# Patient Record
Sex: MALE | Race: WHITE | NOT HISPANIC OR LATINO | Employment: FULL TIME | URBAN - METROPOLITAN AREA
[De-identification: names, ages, dates, MRNs, and addresses within clinical notes are randomized per-mention and may not be internally consistent; named-entity substitution may affect disease eponyms.]

---

## 2017-06-16 ENCOUNTER — OFFICE VISIT (OUTPATIENT)
Dept: INTERNAL MEDICINE | Facility: CLINIC | Age: 37
End: 2017-06-16
Payer: COMMERCIAL

## 2017-06-16 VITALS
HEIGHT: 71 IN | HEART RATE: 77 BPM | BODY MASS INDEX: 25.74 KG/M2 | SYSTOLIC BLOOD PRESSURE: 120 MMHG | WEIGHT: 183.88 LBS | OXYGEN SATURATION: 99 % | TEMPERATURE: 97 F | DIASTOLIC BLOOD PRESSURE: 82 MMHG

## 2017-06-16 DIAGNOSIS — Z23 NEED FOR TDAP VACCINATION: ICD-10-CM

## 2017-06-16 DIAGNOSIS — F41.9 ANXIETY: ICD-10-CM

## 2017-06-16 DIAGNOSIS — Z00.00 ROUTINE GENERAL MEDICAL EXAMINATION AT A HEALTH CARE FACILITY: Primary | ICD-10-CM

## 2017-06-16 PROCEDURE — 99385 PREV VISIT NEW AGE 18-39: CPT | Mod: 25,S$GLB,, | Performed by: INTERNAL MEDICINE

## 2017-06-16 PROCEDURE — 90471 IMMUNIZATION ADMIN: CPT | Mod: S$GLB,,, | Performed by: INTERNAL MEDICINE

## 2017-06-16 PROCEDURE — 99999 PR PBB SHADOW E&M-NEW PATIENT-LVL III: CPT | Mod: PBBFAC,,, | Performed by: INTERNAL MEDICINE

## 2017-06-16 PROCEDURE — 90715 TDAP VACCINE 7 YRS/> IM: CPT | Mod: S$GLB,,, | Performed by: INTERNAL MEDICINE

## 2017-06-16 RX ORDER — FLUOXETINE 10 MG/1
10 CAPSULE ORAL DAILY
Qty: 90 CAPSULE | Refills: 3 | Status: SHIPPED | OUTPATIENT
Start: 2017-06-16 | End: 2018-06-24 | Stop reason: SDUPTHER

## 2017-06-16 RX ORDER — FLUOXETINE 10 MG/1
1 CAPSULE ORAL DAILY
Refills: 0 | COMMUNITY
Start: 2017-05-02 | End: 2017-06-16 | Stop reason: SDUPTHER

## 2017-06-16 RX ORDER — ONDANSETRON 4 MG/1
1 TABLET, ORALLY DISINTEGRATING ORAL DAILY
Refills: 0 | COMMUNITY
Start: 2017-04-24 | End: 2017-06-16

## 2017-06-16 NOTE — PROGRESS NOTES
"Subjective:      Patient ID: Sylvain Theodore is a 36 y.o. male.    Chief Complaint: Establish Care    37 yo with History reviewed. No pertinent past medical history.    Here today for annual exam/ prevent exam. Feeling well and in usual state of health.  Non smoker. prozac continues to work well for anxiety.     family history includes No Known Problems in his father and mother.                Review of Systems   Constitutional: Negative for chills and fever.   HENT: Negative for ear pain and sore throat.    Respiratory: Negative for cough.    Cardiovascular: Negative for chest pain.   Gastrointestinal: Negative for abdominal pain and blood in stool.   Genitourinary: Negative for dysuria and hematuria.   Neurological: Negative for seizures and syncope.     Objective:   /82 (BP Location: Right arm, Patient Position: Sitting)   Pulse 77   Temp 96.7 °F (35.9 °C) (Tympanic)   Ht 5' 11" (1.803 m)   Wt 83.4 kg (183 lb 13.8 oz)   SpO2 99%   BMI 25.64 kg/m²     Physical Exam   Constitutional: He is oriented to person, place, and time. He appears well-developed and well-nourished. No distress.   HENT:   Head: Normocephalic and atraumatic.   Mouth/Throat: Oropharynx is clear and moist.   Eyes: EOM are normal. Pupils are equal, round, and reactive to light.   Neck: Neck supple. No thyromegaly present.   Cardiovascular: Normal rate and regular rhythm.    Pulmonary/Chest: Breath sounds normal. He has no wheezes. He has no rales.   Abdominal: Soft. Bowel sounds are normal. There is no tenderness.   Lymphadenopathy:     He has no cervical adenopathy.   Neurological: He is alert and oriented to person, place, and time.   Skin: Skin is warm and dry.   Psychiatric: He has a normal mood and affect. His behavior is normal.         Assessment:     1. Routine general medical examination at a health care facility    2. Anxiety    3. Need for Tdap vaccination      Plan:   Routine general medical examination at a [facility]" facility  -     Lipid panel; Future; Expected date: 06/16/2017  -     TSH; Future; Expected date: 06/16/2017  -     Vitamin D; Future; Expected date: 06/16/2017  -     Comprehensive metabolic panel; Future; Expected date: 06/16/2017  -     CBC auto differential; Future; Expected date: 06/16/2017    Anxiety  -     fluoxetine (PROZAC) 10 MG capsule; Take 1 capsule (10 mg total) by mouth once daily.  Dispense: 90 capsule; Refill: 3    Need for Tdap vaccination  -     (In Office Administered) Tdap Vaccine              Return in about 2 years (around 6/16/2019), or if symptoms worsen or fail to improve.

## 2018-06-24 ENCOUNTER — TELEPHONE (OUTPATIENT)
Dept: INTERNAL MEDICINE | Facility: CLINIC | Age: 38
End: 2018-06-24

## 2018-06-24 DIAGNOSIS — F41.9 ANXIETY: ICD-10-CM

## 2018-06-24 RX ORDER — FLUOXETINE 10 MG/1
10 CAPSULE ORAL DAILY
Qty: 90 CAPSULE | Refills: 3 | Status: SHIPPED | OUTPATIENT
Start: 2018-06-24 | End: 2020-08-26

## 2020-04-13 ENCOUNTER — TELEPHONE (OUTPATIENT)
Dept: INTERNAL MEDICINE | Facility: CLINIC | Age: 40
End: 2020-04-13

## 2020-08-26 ENCOUNTER — OFFICE VISIT (OUTPATIENT)
Dept: PRIMARY CARE CLINIC | Facility: CLINIC | Age: 40
End: 2020-08-26
Payer: COMMERCIAL

## 2020-08-26 VITALS
BODY MASS INDEX: 25.67 KG/M2 | SYSTOLIC BLOOD PRESSURE: 130 MMHG | DIASTOLIC BLOOD PRESSURE: 80 MMHG | OXYGEN SATURATION: 98 % | HEART RATE: 82 BPM | WEIGHT: 184.06 LBS | TEMPERATURE: 99 F

## 2020-08-26 DIAGNOSIS — H69.92 EUSTACHIAN TUBE DISORDER, LEFT: ICD-10-CM

## 2020-08-26 DIAGNOSIS — Z71.89 EDUCATED ABOUT COVID-19 VIRUS INFECTION: Primary | ICD-10-CM

## 2020-08-26 PROCEDURE — 99204 OFFICE O/P NEW MOD 45 MIN: CPT | Mod: S$GLB,,, | Performed by: FAMILY MEDICINE

## 2020-08-26 PROCEDURE — 99999 PR PBB SHADOW E&M-EST. PATIENT-LVL III: ICD-10-PCS | Mod: PBBFAC,,, | Performed by: FAMILY MEDICINE

## 2020-08-26 PROCEDURE — 99204 PR OFFICE/OUTPT VISIT, NEW, LEVL IV, 45-59 MIN: ICD-10-PCS | Mod: S$GLB,,, | Performed by: FAMILY MEDICINE

## 2020-08-26 PROCEDURE — 99999 PR PBB SHADOW E&M-EST. PATIENT-LVL III: CPT | Mod: PBBFAC,,, | Performed by: FAMILY MEDICINE

## 2020-08-26 NOTE — PATIENT INSTRUCTIONS
"Flonase Sensimyst - 1 spray each nostril twice a day for 3 days then decrease to 1 spray each nostril daily for ear fluid/nasal congestion. Can follow it with Afrin/Oxymetazoline nasal spray for 3 days. If not improving, please send me a MyChart [facility] Message for further recommendations and treatment options.     It was a pleasure meeting you and I look forward to caring for you and your family!  Sincerely,   Dayami "Sandi" Love MD Daily:   What is this medicine?  Oxymetazoline (OX ee me CHESTER oh donny) is a nasal decongestant. This medicine is used to treat nasal congestion or a stuffy nose. This medicine will not treat an infection.  How should I use this medicine?  This medicine is for use in the nose. Do not take by mouth. Follow the directions on the package label. Shake well before using. Use your medicine at regular intervals or as directed by your health care provider. Do not use it more often than directed. Do not use for more than 3 days in a row without advice. Make sure that you are using your nasal spray correctly. Ask your doctor or health care provider if you have any questions.  Talk to your pediatrician regarding the use of this medicine in children. While this drug may be prescribed for children for selected conditions, precautions do apply.  What side effects may I notice from receiving this medicine?  Side effects that you should report to your doctor or health care professional as soon as possible:  · allergic reactions like skin rash, itching or hives, swelling of the face, lips, or tongue  Side effects that usually do not require medical attention (Report these to your doctor or health care professional if they continue or are bothersome.):  · burning, stinging, or irritation in the nose right after use  · increased nasal discharge  · sneezing  What may interact with this medicine?  Do not take this medicine with any of the following medications:  · MAOIs like Marplan, Nardil, and " "Parnate  What if I miss a dose?  If you miss a dose, use it as soon as you can. If it is almost time for your next dose, use only that dose. Do not use double or extra doses.  Where should I keep my medicine?  Keep out of the reach of children.  Store at room temperature between 20 and 25 degrees C (68 and 77 degrees F). Throw away any unused medicine after the expiration date.  What should I tell my health care provider before I take this medicine?  They need to know if you have any of these conditions:  · diabetes  · heart disease  · high blood pressure  · thyroid disease  · trouble urinating due to an enlarged prostate gland  · an unusual or allergic reaction to oxymetazoline, other medicines, foods, dyes, or preservatives  · pregnant or trying to get pregnant  · breast-feeding  What should I watch for while using this medicine?  Tell your doctor or healthcare professional if your symptoms do not start to get better or if they get worse.  Do not share this bottle with anyone else as this may spread germs.  NOTE:This sheet is a summary. It may not cover all possible information. If you have questions about this medicine, talk to your doctor, pharmacist, or health care provider. Copyright© 2017 [employer]        Viral Syndrome (Adult)  A viral illness may cause a number of symptoms. The symptoms depend on the part of the body that the virus affects. If it settles in your nose, throat, and lungs, it may cause cough, sore throat, congestion, and sometimes headache. If it settles in your stomach and intestinal tract, it may cause vomiting and diarrhea. Sometimes it causes vague symptoms like "aching all over," feeling tired, loss of appetite, or fever.  A viral illness usually lasts 1 to 2 weeks, but sometimes it lasts longer. In some cases, a more serious infection can look like a viral syndrome in the first few days of the illness. You may need another exam and additional tests to know the difference. Watch for " the warning signs listed below.  Home care  Follow these guidelines for taking care of yourself at home:  · If symptoms are severe, rest at home for the first 2 to 3 days.  · Stay away from cigarette smoke - both your smoke and the smoke from others.  · You may use over-the-counter acetaminophen or ibuprofen for fever, muscle aching, and headache, unless another medicine was prescribed for this. If you have chronic liver or kidney disease or ever had a stomach ulcer or GI bleeding, talk with your doctor before using these medicines. No one who is younger than 18 and ill with a fever should take aspirin. It may cause severe disease or death.  · Your appetite may be poor, so a light diet is fine. Avoid dehydration by drinking 8 to 12 8-ounce glasses of fluids each day. This may include water; orange juice; lemonade; apple, grape, and cranberry juice; clear fruit drinks; electrolyte replacement and sports drinks; and decaffeinated teas and coffee. If you have been diagnosed with a kidney disease, ask your doctor how much and what types of fluids you should drink to prevent dehydration. If you have kidney disease, drinking too much fluid can cause it build up in the your body and be dangerous to your health.  · Over-the-counter remedies won't shorten the length of the illness but may be helpful for cough, sore throat; and nasal and sinus congestion. Don't use decongestants if you have high blood pressure.  Follow-up care  Follow up with your healthcare provider if you do not improve over the next week.  Call 911  Get emergency medical care if any of the following occur:  · Convulsion  · Feeling weak, dizzy, or like you are going to faint  · Chest pain, shortness of breath, wheezing, or difficulty breathing  When to seek medical advice  Call your healthcare provider right away if any of these occur:  · Cough with lots of colored sputum (mucus) or blood in your sputum  · Chest pain, shortness of breath, wheezing, or  "difficulty breathing  · Severe headache; face, neck, or ear pain  · Severe, constant pain in the lower right side of your belly (abdominal)  · Continued vomiting (cant keep liquids down)  · Frequent diarrhea (more than 5 times a day); blood (red or black color) or mucus in diarrhea  · Feeling weak, dizzy, or like you are going to faint  · Extreme thirst  · Fever of 100.4°F (38°C) or higher, or as directed by your healthcare provider  Date Last Reviewed: 9/25/2015 © 2000-2017 [employer]. [address] [place] [place] [address]. All rights reserved. This information is not intended as a substitute for professional medical care. Always follow your healthcare professional's instructions.        Viral Syndrome (Child)  A virus is the most common cause of illness among children. This may cause a number of different symptoms, depending on what part of the body is affected. If the virus settles in the nose, throat, and lungs, it causes cough, congestion, and sometimes headache. If it settles in the stomach and intestinal tract, it causes vomiting and diarrhea. Sometimes it causes vague symptoms of "feeling bad all over," with fussiness, poor appetite, poor sleeping, and lots of crying. A light rash may also appear for the first few days, then fade away.  A viral illness usually lasts 1 to 2 weeks, but sometimes it lasts longer. Home measures are all that are needed to treat a viral illness. Antibiotics don't help. Occasionally, a more serious bacterial infection can look like a viral syndrome in the first few days of the illness.   Home care  Follow these guidelines to care for your child at home:  · Fluids. Fever increases water loss from the body. For infants under 1 year old, continue regular feedings (formula or breast). Between feedings give oral rehydration solution, which is available from groceries and drugstores without a prescription. For children older than 1 year, give plenty of fluids like " water, juice, ginger ale, lemonade, fruit-based drinks, or popsicles.    · Food. If your child doesn't want to eat solid foods, it's OK for a few days, as long as he or she drinks lots of fluid. (If your child has been diagnosed with a kidney disease, ask your childs doctor how much and what types of fluids your child should drink to prevent dehydration. If your child has kidney disease, drinking too much fluid can cause it build up in the body and be dangerous to your childs health.)  · Activity. Keep children with a fever at home resting or playing quietly. Encourage frequent naps. Your child may return to day care or school when the fever is gone and he or she is eating well and feeling better.  · Sleep. Periods of sleeplessness and irritability are common. A congested child will sleep best with his or her head and upper body propped up on pillows or with the head of the bed frame raised on a 6-inch block.   · Cough. Coughing is a normal part of this illness. A cool mist humidifier at the bedside may be helpful. Over-the-counter (OTC) cough and cold medicine has not been proved to be any more helpful than sweet syrup with no medicine in it. But these medicines can produce serious side effects, especially in infants younger than 2 years. Dont give OTC cough and cold medicines to children under age 6 years unless your doctor has specifically advised you to do so. Also, dont expose your child to cigarette smoke. It can make the cough worse.  · Nasal congestion. Suction the nose of infants with a rubber bulb syringe. You may put 2 to 3 drops of saltwater (saline) nose drops in each nostril before suctioning to help remove secretions. Saline nose drops are available without a prescription. You can make it by adding 1/4 teaspoon table salt in 1 cup of water.  · Fever. You may give your child acetaminophen or ibuprofen to control pain and fever, unless another medicine was prescribed for this. If your child has  chronic liver or kidney disease or ever had a stomach ulcer or GI bleeding, talk with your doctor before using these medicines. Do not give aspirin to anyone younger than 18 years who is ill with a fever. It may cause severe disease or death liver damage.  · Prevention. Wash your hands before and after touching your sick child to help prevent giving a new illness to your child and to prevent spreading this viral illness to yourself and to other children.  Follow-up care  Follow up with your child's healthcare provider as advised.  When to seek medical advice  Unless your child's health care provider advises otherwise, call the provider right away if:  · Your child is 3 months old or younger and has a fever of 100.4°F (38°C) or higher. (Get medical care right away. Fever in a young baby can be a sign of a dangerous infection.)  · Your child is younger than 2 years of age and has a fever of 100.4°F (38°C) that continues for more than 1 day.  · Your child is 2 years old or older and has a fever of 100.4°F (38°C) that continues for more than 3 days.  · Your child is of any age and has repeated fevers above 104°F (40°C).  · Fussiness or crying that cannot be soothed  Also call for:  · Earache, sinus pain, stiff or painful neck, or headache Increasing abdominal pain or pain that is not getting better after 8 hours  · Repeated diarrhea or vomiting  · Appearance of a new rash  · Signs of dehydration: No wet diapers for 8 hours in infants, little or no urine older children, very dark urine, sunken eyes  · Burning when urinating  Call 911  Seek emergency medical care if any of the following occur:  · Lips or skin that turn blue, purple, or gray  · Neck stiffness or rash with a fever  · Convulsion (seizure)  · Wheezing or trouble breathing  · Unusual fussiness or drowsiness  · Confusion  Date Last Reviewed: 9/25/2015  © 9181-4647 [employer]. [address] [place] [address], [place] [address]. All rights reserved. This  information is not intended as a substitute for professional medical care. Always follow your healthcare professional's instructions.

## 2020-08-26 NOTE — PROGRESS NOTES
Subjective:      Patient ID: Sylvain Theodore is a 39 y.o. male.    Chief Complaint: Follow-up (COVID ) and Establish Care    Disclaimer:  This note is prepared using voice recognition software and as such is likely to have errors and has not been proof read. Please contact me for questions.     Patient is coming in today at the request of luis a Chanel MD for questions in regards to COVID, return to work/play, and education.  He reports that his son Melvin recently was diagnosed with COVID on August 10th.  Since that time his whole family has corn teen.  His son is doing much better and was being seen today for return to play for football.  He himself started to have symptoms around August 16th.  This was approximately 6 days after his son was tested positive.  Around August 19 start to have a headache and a low-grade fever.  He continue to rest push fluids and hydrate.  Had some loss of smell headache and sinus pressure.  As of today on the 20/6 is been 10 days.  He has no further symptoms at this time other than mild fluid and pressure in the left ear as well as some nasal congestion.  He reports his wife has had fever off and on for few days low grade.  She has done Tylenol and ibuprofen.  Her symptoms started on the 18th of August.  He reports that her symptoms are still lingering.  His daughters also had some mild symptoms but did not present with fever.  He ultimately ended up keeping his family home and had questions in regards to return to school for them return to work for himself as well as next steps also.  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 For this show, he does sit 6 feet apart but they do not wear masks at the time of filming. The show lasts 2 hours.     He himself has not had any fever no chest pain no shortness of breath.  Mild  change in smell some sinus pressure some sinus congestion and up until yesterday left ear discomfort.  He has been using Afrin off and on.  And taking some Mucinex.  He has had some mild nausea and mild diarrhea.  He is interested in returning back to his workout routines as well.      No results found for: WBC, HGB, HCT, PLT, CHOL, TRIG, HDL, LDLDIRECT, ALT, AST, NA, K, CL, CREATININE, BUN, CO2, TSH, PSA, INR, GLUF, HGBA1C, MICROALBUR    No image results found.        Review of Systems   Constitutional: Positive for activity change. Negative for appetite change, chills, fatigue and unexpected weight change.   HENT: Positive for congestion and ear pain. Negative for postnasal drip, rhinorrhea, sinus pressure, sinus pain, sneezing, sore throat and trouble swallowing.    Eyes: Negative for pain and visual disturbance.   Respiratory: Negative for cough and shortness of breath.    Cardiovascular: Negative for chest pain and leg swelling.   Gastrointestinal: Negative for abdominal pain, constipation, diarrhea, nausea and vomiting.   Endocrine: Negative for cold intolerance and heat intolerance.   Genitourinary: Negative for difficulty urinating, dysuria and flank pain.   Musculoskeletal: Negative for arthralgias, back pain, joint swelling and neck pain.   Skin: Negative for color change and rash.   Neurological: Negative for dizziness, seizures, weakness and headaches.   Psychiatric/Behavioral: Negative for behavioral problems, dysphoric mood and sleep disturbance. The patient is not nervous/anxious.      Objective:     Vitals:    08/26/20 1111   BP: 130/80   Pulse: 82   Temp: 98.6 °F (37 °C)   SpO2: 98%   Weight: 83.5 kg (184 lb 1.4 oz)     Physical Exam  Vitals signs reviewed.   Constitutional:       General: He is not in acute distress.     Appearance: Normal appearance. He is well-developed and normal weight.   HENT:      Head: Normocephalic and atraumatic.      Right Ear: Tympanic membrane and external ear normal.  Tympanic membrane is not injected, scarred, perforated or erythematous.      Left Ear: External ear normal. Tympanic membrane is erythematous. Tympanic membrane is not injected, scarred or perforated.      Nose: Nose normal.      Mouth/Throat:      Mouth: Mucous membranes are moist.      Pharynx: Oropharynx is clear.   Eyes:      Conjunctiva/sclera: Conjunctivae normal.      Pupils: Pupils are equal, round, and reactive to light.   Neck:      Musculoskeletal: Normal range of motion and neck supple.      Thyroid: No thyroid mass, thyromegaly or thyroid tenderness.   Cardiovascular:      Rate and Rhythm: Normal rate and regular rhythm.      Heart sounds: Normal heart sounds. No murmur. No friction rub. No gallop.    Pulmonary:      Effort: Pulmonary effort is normal. No respiratory distress.      Breath sounds: Normal breath sounds. No decreased breath sounds, wheezing or rhonchi.   Abdominal:      General: Bowel sounds are normal. There is no distension.      Palpations: Abdomen is soft.      Tenderness: There is no abdominal tenderness. There is no rebound.   Musculoskeletal: Normal range of motion.   Lymphadenopathy:      Cervical: No cervical adenopathy.   Skin:     General: Skin is warm and dry.   Neurological:      General: No focal deficit present.      Mental Status: He is alert and oriented to person, place, and time. Mental status is at baseline.      Motor: Motor function is intact.      Coordination: Coordination is intact. Coordination normal.   Psychiatric:         Attention and Perception: Attention normal.         Mood and Affect: Mood and affect normal.         Speech: Speech normal.         Behavior: Behavior normal.         Thought Content: Thought content normal.         Cognition and Memory: Cognition normal.         Judgment: Judgment normal.       Assessment:     1. Educated About Covid-19 Virus Infection    2. Eustachian tube disorder, left      Plan:   Sylvain was seen today for follow-up and  "establish care.    Diagnoses and all orders for this visit:    Educated About Covid-19 Virus Infection  Comments:  Provided the patient today with detailed information about return to work as well as CDC recommendations in regards to COVID and quarantine.  At this time his symptoms presented on August 16th he is now at August 26 10 days out he has not had any fever or further symptoms of any cough sneezing or respiratory illness.  He is okay to return to work however to be safe for his [employer] would recommend waiting until next week until his return to the television show when full resolution of symptoms have occurred with his entire household including his wife for full 10 days from her symptom initiated.  He was appreciative of this information.  We discussed warning signs such as fatigue or shortness of breath.  We discussed importance of hydration use of probiotics for diarrhea if noted.  We also discussed the importance of rest.  We discussed symptoms and children.  We also discussed ability to do EKGs or lab work or additional imaging if necessary.  Provided information on [facility] as well in regards to covid -19 and pediatric patients.     Eustachian tube disorder, left  Comments:  New, recommend Flonase twice daily along with Afrin for the next 3 days then once daily.  Can use nasal saline.  Follow up if not improving in 3 days            Follow up if symptoms worsen or fail to improve.    Patient Instructions   Flonase Sensimyst - 1 spray each nostril twice a day for 3 days then decrease to 1 spray each nostril daily for ear fluid/nasal congestion. Can follow it with Afrin/Oxymetazoline nasal spray for 3 days. If not improving, please send me a MyChart [facility] Message for further recommendations and treatment options.     It was a pleasure meeting you and I look forward to caring for you and your family!  Sincerely,   Dayami "Sandi" Emely Daily:   What is this " medicine?  Oxymetazoline (OX ee me CHESTER oh donny) is a nasal decongestant. This medicine is used to treat nasal congestion or a stuffy nose. This medicine will not treat an infection.  How should I use this medicine?  This medicine is for use in the nose. Do not take by mouth. Follow the directions on the package label. Shake well before using. Use your medicine at regular intervals or as directed by your health care provider. Do not use it more often than directed. Do not use for more than 3 days in a row without advice. Make sure that you are using your nasal spray correctly. Ask your doctor or health care provider if you have any questions.  Talk to your pediatrician regarding the use of this medicine in children. While this drug may be prescribed for children for selected conditions, precautions do apply.  What side effects may I notice from receiving this medicine?  Side effects that you should report to your doctor or health care professional as soon as possible:  · allergic reactions like skin rash, itching or hives, swelling of the face, lips, or tongue  Side effects that usually do not require medical attention (Report these to your doctor or health care professional if they continue or are bothersome.):  · burning, stinging, or irritation in the nose right after use  · increased nasal discharge  · sneezing  What may interact with this medicine?  Do not take this medicine with any of the following medications:  · MAOIs like Marplan, Nardil, and Parnate  What if I miss a dose?  If you miss a dose, use it as soon as you can. If it is almost time for your next dose, use only that dose. Do not use double or extra doses.  Where should I keep my medicine?  Keep out of the reach of children.  Store at room temperature between 20 and 25 degrees C (68 and 77 degrees F). Throw away any unused medicine after the expiration date.  What should I tell my health care provider before I take this medicine?  They need to know  "if you have any of these conditions:  · diabetes  · heart disease  · high blood pressure  · thyroid disease  · trouble urinating due to an enlarged prostate gland  · an unusual or allergic reaction to oxymetazoline, other medicines, foods, dyes, or preservatives  · pregnant or trying to get pregnant  · breast-feeding  What should I watch for while using this medicine?  Tell your doctor or healthcare professional if your symptoms do not start to get better or if they get worse.  Do not share this bottle with anyone else as this may spread germs.  NOTE:This sheet is a summary. It may not cover all possible information. If you have questions about this medicine, talk to your doctor, pharmacist, or health care provider. Copyright© 2017 [employer]        Viral Syndrome (Adult)  A viral illness may cause a number of symptoms. The symptoms depend on the part of the body that the virus affects. If it settles in your nose, throat, and lungs, it may cause cough, sore throat, congestion, and sometimes headache. If it settles in your stomach and intestinal tract, it may cause vomiting and diarrhea. Sometimes it causes vague symptoms like "aching all over," feeling tired, loss of appetite, or fever.  A viral illness usually lasts 1 to 2 weeks, but sometimes it lasts longer. In some cases, a more serious infection can look like a viral syndrome in the first few days of the illness. You may need another exam and additional tests to know the difference. Watch for the warning signs listed below.  Home care  Follow these guidelines for taking care of yourself at home:  · If symptoms are severe, rest at home for the first 2 to 3 days.  · Stay away from cigarette smoke - both your smoke and the smoke from others.  · You may use over-the-counter acetaminophen or ibuprofen for fever, muscle aching, and headache, unless another medicine was prescribed for this. If you have chronic liver or kidney disease or ever had a stomach ulcer or " GI bleeding, talk with your doctor before using these medicines. No one who is younger than 18 and ill with a fever should take aspirin. It may cause severe disease or death.  · Your appetite may be poor, so a light diet is fine. Avoid dehydration by drinking 8 to 12 8-ounce glasses of fluids each day. This may include water; orange juice; lemonade; apple, grape, and cranberry juice; clear fruit drinks; electrolyte replacement and sports drinks; and decaffeinated teas and coffee. If you have been diagnosed with a kidney disease, ask your doctor how much and what types of fluids you should drink to prevent dehydration. If you have kidney disease, drinking too much fluid can cause it build up in the your body and be dangerous to your health.  · Over-the-counter remedies won't shorten the length of the illness but may be helpful for cough, sore throat; and nasal and sinus congestion. Don't use decongestants if you have high blood pressure.  Follow-up care  Follow up with your healthcare provider if you do not improve over the next week.  Call 911  Get emergency medical care if any of the following occur:  · Convulsion  · Feeling weak, dizzy, or like you are going to faint  · Chest pain, shortness of breath, wheezing, or difficulty breathing  When to seek medical advice  Call your healthcare provider right away if any of these occur:  · Cough with lots of colored sputum (mucus) or blood in your sputum  · Chest pain, shortness of breath, wheezing, or difficulty breathing  · Severe headache; face, neck, or ear pain  · Severe, constant pain in the lower right side of your belly (abdominal)  · Continued vomiting (cant keep liquids down)  · Frequent diarrhea (more than 5 times a day); blood (red or black color) or mucus in diarrhea  · Feeling weak, dizzy, or like you are going to faint  · Extreme thirst  · Fever of 100.4°F (38°C) or higher, or as directed by your healthcare provider  Date Last Reviewed: 9/25/2015  ©  "2158-1313 [employer]. [address] [place] [address], [place] [address]. All rights reserved. This information is not intended as a substitute for professional medical care. Always follow your healthcare professional's instructions.        Viral Syndrome (Child)  A virus is the most common cause of illness among children. This may cause a number of different symptoms, depending on what part of the body is affected. If the virus settles in the nose, throat, and lungs, it causes cough, congestion, and sometimes headache. If it settles in the stomach and intestinal tract, it causes vomiting and diarrhea. Sometimes it causes vague symptoms of "feeling bad all over," with fussiness, poor appetite, poor sleeping, and lots of crying. A light rash may also appear for the first few days, then fade away.  A viral illness usually lasts 1 to 2 weeks, but sometimes it lasts longer. Home measures are all that are needed to treat a viral illness. Antibiotics don't help. Occasionally, a more serious bacterial infection can look like a viral syndrome in the first few days of the illness.   Home care  Follow these guidelines to care for your child at home:  · Fluids. Fever increases water loss from the body. For infants under 1 year old, continue regular feedings (formula or breast). Between feedings give oral rehydration solution, which is available from groceries and drugstores without a prescription. For children older than 1 year, give plenty of fluids like water, juice, ginger ale, lemonade, fruit-based drinks, or popsicles.    · Food. If your child doesn't want to eat solid foods, it's OK for a few days, as long as he or she drinks lots of fluid. (If your child has been diagnosed with a kidney disease, ask your childs doctor how much and what types of fluids your child should drink to prevent dehydration. If your child has kidney disease, drinking too much fluid can cause it build up in the body and be dangerous to your " childs health.)  · Activity. Keep children with a fever at home resting or playing quietly. Encourage frequent naps. Your child may return to day care or school when the fever is gone and he or she is eating well and feeling better.  · Sleep. Periods of sleeplessness and irritability are common. A congested child will sleep best with his or her head and upper body propped up on pillows or with the head of the bed frame raised on a 6-inch block.   · Cough. Coughing is a normal part of this illness. A cool mist humidifier at the bedside may be helpful. Over-the-counter (OTC) cough and cold medicine has not been proved to be any more helpful than sweet syrup with no medicine in it. But these medicines can produce serious side effects, especially in infants younger than 2 years. Dont give OTC cough and cold medicines to children under age 6 years unless your doctor has specifically advised you to do so. Also, dont expose your child to cigarette smoke. It can make the cough worse.  · Nasal congestion. Suction the nose of infants with a rubber bulb syringe. You may put 2 to 3 drops of saltwater (saline) nose drops in each nostril before suctioning to help remove secretions. Saline nose drops are available without a prescription. You can make it by adding 1/4 teaspoon table salt in 1 cup of water.  · Fever. You may give your child acetaminophen or ibuprofen to control pain and fever, unless another medicine was prescribed for this. If your child has chronic liver or kidney disease or ever had a stomach ulcer or GI bleeding, talk with your doctor before using these medicines. Do not give aspirin to anyone younger than 18 years who is ill with a fever. It may cause severe disease or death liver damage.  · Prevention. Wash your hands before and after touching your sick child to help prevent giving a new illness to your child and to prevent spreading this viral illness to yourself and to other children.  Follow-up  care  Follow up with your child's healthcare provider as advised.  When to seek medical advice  Unless your child's health care provider advises otherwise, call the provider right away if:  · Your child is 3 months old or younger and has a fever of 100.4°F (38°C) or higher. (Get medical care right away. Fever in a young baby can be a sign of a dangerous infection.)  · Your child is younger than 2 years of age and has a fever of 100.4°F (38°C) that continues for more than 1 day.  · Your child is 2 years old or older and has a fever of 100.4°F (38°C) that continues for more than 3 days.  · Your child is of any age and has repeated fevers above 104°F (40°C).  · Fussiness or crying that cannot be soothed  Also call for:  · Earache, sinus pain, stiff or painful neck, or headache Increasing abdominal pain or pain that is not getting better after 8 hours  · Repeated diarrhea or vomiting  · Appearance of a new rash  · Signs of dehydration: No wet diapers for 8 hours in infants, little or no urine older children, very dark urine, sunken eyes  · Burning when urinating  Call 911  Seek emergency medical care if any of the following occur:  · Lips or skin that turn blue, purple, or gray  · Neck stiffness or rash with a fever  · Convulsion (seizure)  · Wheezing or trouble breathing  · Unusual fussiness or drowsiness  · Confusion  Date Last Reviewed: 9/25/2015  © 2510-5134 [employer]. [address] [place] [address]. All rights reserved. This information is not intended as a substitute for professional medical care. Always follow your healthcare professional's instructions.          Time spent: 45 minutes in face to face discussion concerning diagnosis, prognosis, review of lab and test results, benefits of treatment as well as management of disease, counseling of patient and coordination of care between various health care providers . Greater than half the time spent was used for coordination of care and  counseling of patient.

## 2021-04-29 ENCOUNTER — PATIENT MESSAGE (OUTPATIENT)
Dept: RESEARCH | Facility: HOSPITAL | Age: 41
End: 2021-04-29